# Patient Record
Sex: MALE | Race: WHITE
[De-identification: names, ages, dates, MRNs, and addresses within clinical notes are randomized per-mention and may not be internally consistent; named-entity substitution may affect disease eponyms.]

---

## 2019-03-21 ENCOUNTER — HOSPITAL ENCOUNTER (OUTPATIENT)
Dept: HOSPITAL 35 - PAIN | Age: 49
Discharge: HOME | End: 2019-03-21
Attending: ANESTHESIOLOGY
Payer: COMMERCIAL

## 2019-03-21 VITALS — BODY MASS INDEX: 31.65 KG/M2 | WEIGHT: 190 LBS | HEIGHT: 65 IN

## 2019-03-21 VITALS — DIASTOLIC BLOOD PRESSURE: 79 MMHG | SYSTOLIC BLOOD PRESSURE: 139 MMHG

## 2019-03-21 DIAGNOSIS — G89.29: ICD-10-CM

## 2019-03-21 DIAGNOSIS — F17.210: ICD-10-CM

## 2019-03-21 DIAGNOSIS — Z79.899: ICD-10-CM

## 2019-03-21 DIAGNOSIS — Z98.890: ICD-10-CM

## 2019-03-21 DIAGNOSIS — Z79.891: ICD-10-CM

## 2019-03-21 DIAGNOSIS — M47.817: Primary | ICD-10-CM

## 2019-03-21 NOTE — NUR
Pain Clinic Assessment:
 
1. History of Osteoarthritis:
Not Applicable
   History of Rheumatoid Arthritis:
Not Applicable
 
2. Height: 5 ft. 5 in. 165.1 cm.
   Weight: 190.0 lb.  oz. 86.184 kg.
   Patient's BMI: 31.6
 
3. Vital Signs:
   BP: 139/79 Pulse: 101 Resp: 18
   Temp:  02 Sat: 97 ECG Mon:
 
4. Pain Intensity: 10
 
5. Fall Risk:
   Dizziness: N  Needs help standing or walking: N
   Fallen in the last 3 months: N
   Fall risk comments:
 
 
6. Patient on Blood Thinner: None
 
7. History of Hypertension: N
 
8. Opioid Therapy greater than 6 weeks: N
   Opiate Contract Signed:
 
9. Risk Assessment Tool Provided: MODERATE RISK 6/7
 
10. Functional Assessment Tool: 58/70
 
11. Recreational Drug Use: Never Drug Type:
    Tobacco Use: Current Every Day Smoker Tobacco Type: Cigarettes
       Amount or Packs/day: 1 PACK How Many Years: 30
    Alcohol Use: Yes  Frequency: Weekly Quant: 1